# Patient Record
Sex: FEMALE | Race: WHITE
[De-identification: names, ages, dates, MRNs, and addresses within clinical notes are randomized per-mention and may not be internally consistent; named-entity substitution may affect disease eponyms.]

---

## 2018-06-29 ENCOUNTER — HOSPITAL ENCOUNTER (INPATIENT)
Dept: HOSPITAL 92 - L&D/OP | Age: 33
LOS: 3 days | Discharge: HOME | End: 2018-07-02
Attending: OBSTETRICS & GYNECOLOGY | Admitting: OBSTETRICS & GYNECOLOGY
Payer: COMMERCIAL

## 2018-06-29 VITALS — BODY MASS INDEX: 47.1 KG/M2

## 2018-06-29 DIAGNOSIS — O32.8XX0: Primary | ICD-10-CM

## 2018-06-29 DIAGNOSIS — Z3A.39: ICD-10-CM

## 2018-06-29 LAB
ANALYZER IN CARDIO: (no result)
BASE EXCESS STD BLDV CALC-SCNC: -5.5 MEQ/L
HBSAG INDEX: 0.17 S/CO (ref 0–0.99)
HCO3 BLDV-SCNC: 20 MEQ/L (ref 22–28)
HGB BLD-MCNC: 11.2 G/DL (ref 12–16)
MCH RBC QN AUTO: 32.6 PG (ref 27–31)
MCV RBC AUTO: 94.7 FL (ref 78–98)
PLATELET # BLD AUTO: 257 THOU/UL (ref 130–400)
RBC # BLD AUTO: 3.42 MILL/UL (ref 4.2–5.4)
SYPHILIS ANTIBODY INDEX: 0.03 S/CO
WBC # BLD AUTO: 10.1 THOU/UL (ref 4.8–10.8)

## 2018-06-29 PROCEDURE — 86850 RBC ANTIBODY SCREEN: CPT

## 2018-06-29 PROCEDURE — 87340 HEPATITIS B SURFACE AG IA: CPT

## 2018-06-29 PROCEDURE — 82805 BLOOD GASES W/O2 SATURATION: CPT

## 2018-06-29 PROCEDURE — 86901 BLOOD TYPING SEROLOGIC RH(D): CPT

## 2018-06-29 PROCEDURE — 36415 COLL VENOUS BLD VENIPUNCTURE: CPT

## 2018-06-29 PROCEDURE — 99285 EMERGENCY DEPT VISIT HI MDM: CPT

## 2018-06-29 PROCEDURE — 86900 BLOOD TYPING SEROLOGIC ABO: CPT

## 2018-06-29 PROCEDURE — 86780 TREPONEMA PALLIDUM: CPT

## 2018-06-29 PROCEDURE — 85027 COMPLETE CBC AUTOMATED: CPT

## 2018-06-29 PROCEDURE — 51702 INSERT TEMP BLADDER CATH: CPT

## 2018-06-29 NOTE — PDOC.LDHP
Labor and Delivery H&P


Chief complaint: loss of fluid


HPI: 





32 y/o G1 at 39w2d, patient of Dr. Zarate, presents with SROM about an hour 

ago.  Denies regular ctx, vb or decreased FM.  Was breech in office and 

scheduled for version next week.  Last SVE 1cm.





ROS neg for HEENT, cv, pulm, gi, gu, neuro, psych, skin, musculoskeletal or 

constitutional symptoms other than mentioned above.


OB History Details: 





First pregnancy


Current pregnancy complications: breech


Past Medical History: 





None


Current medications: pre-saturnino vitamins


Previous surgical history: other (Lap band)


Allergies/Adverse Reactions: 


 Allergies











Allergy/AdvReac Type Severity Reaction Status Date / Time


 


No Known Allergies Allergy   Verified 13 09:55











Social history: none





- Physical Exam


Vital signs reviewed and normal: yes


General: NAD


Lungs: nonlabored breathing


Abdomen: gravid (NTTP)


Extremeties: no edema


FHT: category 1 (140s, mod variability, + accels, no decels)


[place] contractions every: 5 mins





- Vaginal Exam


cm dilated: 5 (Footling breech with leg through cervix to thigh.  No cord 

palpated.)


Effacement: 100%


Station: -3





- Assessment


L&D Assessment: term rupture in membranes





- Plan


Plan: admit to L&D, to OR for  section, informed consent obtained, 

anesthesia consult for pain management


-: 





Dr. Zarate notified.

## 2018-06-29 NOTE — PDOC.OPDEL
OB Operative/Delivery Note


Delivery Dr/Surgeon: Tessa


Assist: Devang


Pre-Delivery Diagnosis: active labor, breech


Procedure/Post Delivery Dx: primary low transverse CS


Weeks gestation: 39


Anesthesia: other (GETA)





- Findings


  ** A


Sex: male


Weight: 8 lb 6 oz


Apgar - 1 min: 9


Apgar - 5 min: 9





- Additional Findings/Plan


Placenta delivered: manual removal


 findings: low transverse hysterotomy without extension


Estimated blood loss: 800ml


Compilations/Other Findings: 





none





Post delivery plan: routine recovery

## 2018-06-30 LAB
HGB BLD-MCNC: 8.9 G/DL (ref 12–16)
MCH RBC QN AUTO: 32.5 PG (ref 27–31)
MCV RBC AUTO: 95.2 FL (ref 78–98)
PLATELET # BLD AUTO: 212 THOU/UL (ref 130–400)
RBC # BLD AUTO: 2.74 MILL/UL (ref 4.2–5.4)
WBC # BLD AUTO: 15.6 THOU/UL (ref 4.8–10.8)

## 2018-06-30 RX ADMIN — DOCUSATE CALCIUM SCH MG: 240 CAPSULE, LIQUID FILLED ORAL at 21:26

## 2018-06-30 RX ADMIN — SIMETHICONE PRN MG: 80 TABLET, CHEWABLE ORAL at 10:00

## 2018-06-30 RX ADMIN — DOCUSATE CALCIUM SCH: 240 CAPSULE, LIQUID FILLED ORAL at 14:09

## 2018-06-30 RX ADMIN — HYDROCODONE BITARTRATE AND ACETAMINOPHEN PRN TAB: 5; 325 TABLET ORAL at 21:25

## 2018-06-30 RX ADMIN — HYDROCODONE BITARTRATE AND ACETAMINOPHEN PRN TAB: 5; 325 TABLET ORAL at 14:56

## 2018-06-30 RX ADMIN — SIMETHICONE PRN MG: 80 TABLET, CHEWABLE ORAL at 21:26

## 2018-06-30 NOTE — PDOC.PP
Post Partum Progress Note


Post Partum Day #: 1


Subjective: 





Doing well, no complaints.  Pain well controlled.  


PO intake tolerated: yes (CLD)


Flatus: no


Ambulation: no


 Vital Signs (12 hours)











  Temp Pulse Resp BP Pulse Ox


 


 18 03:41  98.6 F  75  16  103/53 L 


 


 18 02:20  97.5 F L  82  16  124/60 


 


 18 01:20  98.9 F  81  16  140/70 


 


 18 23:50  98.4 F  92  18   100


 


 18 22:24  98.4 F    


 


 18 21:39  98.4 F    


 


 18 21:20  98.4 F  104 H   








 Weight











Weight                         292 lb

















- Physical Examination


General: NAD


Respiratory: non-labored breathing


Abdominal: lochia (normal), no distention, appropriately TTP


Fundus firm & at: U-3


Skin: no rash (Dressing clean, dry, intact)


Neurological: no gross focal deficits


Psychiatric: A&Ox3, normal affect


Result Diagrams: 


 18 05:42





Additional Labs: 


 Post Partum Labs











Blood Type  O POSITIVE   18  21:48    


 


Hep Bs Antigen  Non-Reactive S/CO (NonReactive)   18  21:48    











(1) Delivery by  section for footling breech presentation


Code(s): O32.8XX0 - MATERNAL CARE FOR OTH MALPRESENTATION OF FETUS, UNSP   

Status: Acute   





- Assessment/Plan





Continue routine postop day 1 management.  Encouraged ambulation.  Possible d/c 

home tomorrow vs Monday.

## 2018-06-30 NOTE — ADD-OP
ADDENDUM

 

DATE OF SERVICE:  2018

 

I was present and scrubbed to assist the uncomplicated primary  for footling breech with NIXON
M with Dr. Zarate.  Please see her note for full details of the procedure.

## 2018-06-30 NOTE — OP
DATE OF SURGERY:  2018

 

PREOPERATIVE DIAGNOSES:

1.  A 33-year-old white female, G1, P0 at 39 weeks 2 days gestation.

2.  Spontaneous rupture of membranes, labor with footling breech presentation.

 

POSTOPERATIVE DIAGNOSES:

1.  A 33-year-old white female, G1, P0 at 39 weeks 2 days gestation.

2.  Spontaneous rupture of membranes, labor with footling breech presentation.

 

PROCEDURE PERFORMED:  Urgent primary low transverse  section.

 

ANESTHESIA:  General endotracheal.

 

ESTIMATED BLOOD LOSS:  800 mL.

 

COMPLICATIONS:  None.

 

COUNTS:  Correct x2.

 

SURGEON:  Tracy Zarate M.D.

 

ASSISTANT SURGEON:  Denise Siddiqi M.D.

 

FINDINGS:

1.  Vigorous male infant noted to be footling breech presentation with prolapse of 1 foot into the va
ginal vault.  Apgars of the infant 9 and 9, birth weight 8 pounds 6 ounces.

2.  Normal appearing fallopian tubes, uterus, and ovaries.

3.  Clear urine present in Bailey catheter post procedure.

 

DISPOSITION:  To recovery room stable.

 

DESCRIPTION OF OPERATIVE PROCEDURE:  The patient previously received informed consent in regards to jcarlos vásquez.  She was taken back to the operating room where she received a general endotracheal anestheti
c agent due to the urgent nature of the  section.  She was prepped during this process, drape
d in usual sterile fashion with general endotracheal anesthetic agent confirmed, Pfannenstiel incisio
n was made.  It was carried down to the fascia.  Fascia was nicked in the midline.  Fascial incision 
was extended bilaterally with curved Castro scissors.  The rectus fascia was dissected superiorly and i
nferiorly off the rectus muscle bellies.  The rectus muscle bellies were divided in the midline.  The
 peritoneal cavity was entered.  Juan O retractor was then placed to aid in exposure.  A 2-cm hyste
rotomy incision was made above the fold of the vesicouterine peritoneum and the hysterotomy incision 
was extended via finger fractionation.  The infant was delivered by grabbing one of the feet and brin
ging this through the hysterotomy and is unable to grabbing the leg that had prolapsed into the vagin
a, pulling that up through the hysterotomy incision and then with a corkscrewing technique grabbing t
he buttocks twisting the torso, the infant allowed for easy delivery of the arms and the head of the 
infant atraumatically.  The cord was doubly clamped and cut and the baby was handed to the neonatolog
ist, Dr. Liang who was in attendance.  Usual cord blood and cord gas was obtained.  Placenta was
 manually extracted.  The uterus was curetted of any remaining placental fragments with dry laparotom
y sponge.  Hysterotomy incision was then closed with #1 Monocryl securing hemostasis.  This was close
d in running locking fashion.  Hysterotomy incision was inspected.  Hemostasis was confirmed.  The Al
exis O retractor was removed.  The pelvis again was irrigated and suctioned.  Hemostasis was confirme
d.  The rectus muscle bellies were then inspected and noted to be hemostatic.  The fascia was then cl
osed with 0 PDS suture x2 in running continuous fashion.  Subcutaneous tissue was irrigated and noted
 be hemostatic and was approximated with a running 3-0 plain gut suture.  Staples were placed to clos
e the skin incision and the surgery was terminated.  No anesthetic or surgical complications.

## 2018-07-01 RX ADMIN — HYDROCODONE BITARTRATE AND ACETAMINOPHEN PRN TAB: 5; 325 TABLET ORAL at 03:17

## 2018-07-01 RX ADMIN — DOCUSATE CALCIUM SCH MG: 240 CAPSULE, LIQUID FILLED ORAL at 09:21

## 2018-07-01 RX ADMIN — SIMETHICONE PRN MG: 80 TABLET, CHEWABLE ORAL at 09:20

## 2018-07-01 RX ADMIN — HYDROCODONE BITARTRATE AND ACETAMINOPHEN PRN TAB: 5; 325 TABLET ORAL at 09:21

## 2018-07-01 RX ADMIN — DOCUSATE CALCIUM SCH MG: 240 CAPSULE, LIQUID FILLED ORAL at 22:35

## 2018-07-01 RX ADMIN — HYDROCODONE BITARTRATE AND ACETAMINOPHEN PRN TAB: 5; 325 TABLET ORAL at 15:23

## 2018-07-01 NOTE — PDOC.PP
Post Partum Progress Note


Post Partum Day #: 2


Subjective: 





Doing well. Case reviewed with her (footling breech)


PO intake tolerated: yes


Flatus: yes


Ambulation: yes


 Vital Signs (12 hours)











  Temp Pulse Resp BP BP


 


 18 03:50  98.0 F  102 H  16   135/64


 


 18 00:00  99.0 F  98  16  108/59 L 


 


 18 20:00  98.1 F  111 H  18  118/59 L 








 Weight











Weight                         292 lb

















- Physical Examination


Cardiovascular: no m/r/g


Respiratory: clear to auscultation bilaterally


Abdominal: + bowel sounds, lochia, no distention, appropriately TTP


Extremities: negative homans (B)


Skin: CS incision dry & intact (staples in use)


Neurological: no gross focal deficits


Psychiatric: A&Ox3, normal affect


Result Diagrams: 


 18 05:42





Additional Labs: 


 Post Partum Labs











Blood Type  O POSITIVE   18  21:48    


 


Hep Bs Antigen  Non-Reactive S/CO (NonReactive)   18  21:48    











(1) Delivery by  section for footling breech presentation


Code(s): O32.8XX0 - MATERNAL CARE FOR OTH MALPRESENTATION OF FETUS, UNSP   

Status: Acute   





- Assessment/Plan





POD 2 for footling breech. CS was at night, so will be POD 2 tonight. No 

evidence ileus. Doing well, was ambulating in room. Incision checked by me. 

Probable discharge home in AM tomorrow

## 2018-07-02 VITALS — DIASTOLIC BLOOD PRESSURE: 56 MMHG | TEMPERATURE: 98.5 F | SYSTOLIC BLOOD PRESSURE: 117 MMHG

## 2018-07-02 RX ADMIN — DOCUSATE CALCIUM SCH MG: 240 CAPSULE, LIQUID FILLED ORAL at 08:04

## 2018-07-02 RX ADMIN — HYDROCODONE BITARTRATE AND ACETAMINOPHEN PRN TAB: 5; 325 TABLET ORAL at 08:05

## 2018-07-02 RX ADMIN — HYDROCODONE BITARTRATE AND ACETAMINOPHEN PRN TAB: 5; 325 TABLET ORAL at 13:45

## 2018-07-02 NOTE — PDOC.PP
Post Partum Progress Note


Post Partum Day #: 3


Flatus: yes


Ambulation: yes


 Vital Signs (12 hours)











  Temp Pulse Resp BP


 


 07/02/18 02:00  99.0 F  100  18 


 


 07/02/18 01:50  99.0 F  100  


 


 07/02/18 00:00  99.0 F  100  18 


 


 07/01/18 20:00  99.1 F  112 H  18  122/66








 Weight











Weight                         292 lb

















- Physical Examination


General: NAD


Cardiovascular: no m/r/g, RRR


Respiratory: clear to auscultation bilaterally, non-labored breathing


Abdominal: + bowel sounds, lochia, no distention, appropriately TTP


Result Diagrams: 


 06/30/18 05:42





Additional Labs: 


 Post Partum Labs











Blood Type  O POSITIVE   06/29/18  21:48    


 


Hep Bs Antigen  Non-Reactive S/CO (NonReactive)   06/29/18  21:48    














- Assessment/Plan





post op day 3...doing well. d/c home on tramadol and otc motrin. staples out 

post op day 7.6 week post partum.